# Patient Record
Sex: FEMALE | Race: BLACK OR AFRICAN AMERICAN | NOT HISPANIC OR LATINO
[De-identification: names, ages, dates, MRNs, and addresses within clinical notes are randomized per-mention and may not be internally consistent; named-entity substitution may affect disease eponyms.]

---

## 2018-05-09 PROBLEM — Z00.00 ENCOUNTER FOR PREVENTIVE HEALTH EXAMINATION: Status: ACTIVE | Noted: 2018-05-09

## 2021-05-31 ENCOUNTER — TRANSCRIPTION ENCOUNTER (OUTPATIENT)
Age: 26
End: 2021-05-31

## 2022-01-11 ENCOUNTER — TRANSCRIPTION ENCOUNTER (OUTPATIENT)
Age: 27
End: 2022-01-11

## 2022-10-28 ENCOUNTER — APPOINTMENT (OUTPATIENT)
Dept: ORTHOPEDIC SURGERY | Facility: CLINIC | Age: 27
End: 2022-10-28
Payer: COMMERCIAL

## 2022-10-28 VITALS — HEIGHT: 64 IN | BODY MASS INDEX: 46.1 KG/M2 | WEIGHT: 270 LBS

## 2022-10-28 DIAGNOSIS — I51.9 HEART DISEASE, UNSPECIFIED: ICD-10-CM

## 2022-10-28 PROCEDURE — 99203 OFFICE O/P NEW LOW 30 MIN: CPT

## 2022-10-28 NOTE — DISCUSSION/SUMMARY
[de-identified] : I reviewed patient's radiographs and discussed her condition and treatment options.  Given intermittent swelling, instability and pain, I advised obtaining further imaging to evaluate for possible osteochondral defects.  Patient voiced understanding and agreement with the plan.\par

## 2022-10-28 NOTE — HISTORY OF PRESENT ILLNESS
[de-identified] : Patient presents for evaluation on LT ankle pain. States she fractured her ankle about 10 years ago. Had proper treatment. States over the past year, she started noticing a lump on the medial side of her ankle and unable to WB on it some mornings. States she sees swelling and feels weakness in ankle. Patient had x-rays at  and saw PCP at [place] who referred to ortho for further management.

## 2022-10-28 NOTE — PHYSICAL EXAM
[Moderate] : moderate swelling of medial ankle [NL (40)] : plantar flexion 40 degrees [2+] : posterior tibialis pulse: 2+ [] : patient ambulates without assistive device [Left] : left ankle [Outside films reviewed] : Outside films reviewed [FreeTextEntry9] : ossicles along medial malleolus and lateral malleolus [TWNoteComboBox7] : dorsiflexion 5 degrees

## 2022-11-01 ENCOUNTER — FORM ENCOUNTER (OUTPATIENT)
Age: 27
End: 2022-11-01

## 2022-11-02 ENCOUNTER — APPOINTMENT (OUTPATIENT)
Dept: MRI IMAGING | Facility: CLINIC | Age: 27
End: 2022-11-02
Payer: COMMERCIAL

## 2022-11-02 PROCEDURE — 73721 MRI JNT OF LWR EXTRE W/O DYE: CPT | Mod: LT

## 2022-11-08 ENCOUNTER — APPOINTMENT (OUTPATIENT)
Dept: ORTHOPEDIC SURGERY | Facility: CLINIC | Age: 27
End: 2022-11-08
Payer: COMMERCIAL

## 2022-11-08 PROCEDURE — 99213 OFFICE O/P EST LOW 20 MIN: CPT

## 2022-11-08 NOTE — HISTORY OF PRESENT ILLNESS
[de-identified] : Since last visit, patient has obtained MRI and is here for results. States she received some blood work back from her PCP and may have thyroid condition.

## 2022-11-08 NOTE — DATA REVIEWED
[Ankle] : ankle [Report was reviewed and noted in the chart] : The report was reviewed and noted in the chart [I independently reviewed and interpreted images and report] : I independently reviewed and interpreted images and report [I reviewed the films/CD and agree] : I reviewed the films/CD and agree [Left] : left [FreeTextEntry1] : High grade tear of the anterior talofibular ligament and calcaneofibular ligament with low-grade tear of deep fibers of the deltoid. Tibiotalar joint effusion. Diffuse soft tissue edema. Minimal scarring of the tarsal sinus ligaments and fat, which can be seen with sinus tarsi syndrome.

## 2022-11-08 NOTE — DISCUSSION/SUMMARY
[de-identified] : I reviewed patient's MRI and discussed her condition and treatment options.  I advised starting PT and HEP.  Wear below knee compression socks.  Follow up with Dr. Rodriguez in 3 weeks.  Patient voiced understanding and agreement with the plan.\par

## 2022-12-01 ENCOUNTER — APPOINTMENT (OUTPATIENT)
Dept: ORTHOPEDIC SURGERY | Facility: CLINIC | Age: 27
End: 2022-12-01
Payer: COMMERCIAL

## 2022-12-01 VITALS — HEIGHT: 64 IN | BODY MASS INDEX: 46.1 KG/M2 | WEIGHT: 270 LBS

## 2022-12-01 DIAGNOSIS — Z78.9 OTHER SPECIFIED HEALTH STATUS: ICD-10-CM

## 2022-12-01 DIAGNOSIS — Z86.39 PERSONAL HISTORY OF OTHER ENDOCRINE, NUTRITIONAL AND METABOLIC DISEASE: ICD-10-CM

## 2022-12-01 PROCEDURE — 99213 OFFICE O/P EST LOW 20 MIN: CPT | Mod: 25

## 2022-12-01 PROCEDURE — 73610 X-RAY EXAM OF ANKLE: CPT | Mod: LT

## 2022-12-01 NOTE — ASSESSMENT
[FreeTextEntry1] : 26yo female with right ankle instability.  Will trial a course of nonsurgical management. \par 1.  cont PT\par 2.  rest/ice/compression/elevation\par 3.  activity modifications\par 4.  nsaids prn pain\par 5.  f/u 5 weeks

## 2022-12-01 NOTE — HISTORY OF PRESENT ILLNESS
[Gradual] : gradual [0] : 0 [Dull/Aching] : dull/aching [Sharp] : sharp [Throbbing] : throbbing [Intermittent] : intermittent [Household chores] : household chores [Leisure] : leisure [Work] : work [Social interactions] : social interactions [Rest] : rest [Full time] : Work status: full time [de-identified] : Patient is here for her left ankle. Patient states she fractured her ankle about 10 years ago without surgical intervention. Patient states over the past year, she started noticing a lump on the medial side of her ankle and unable to weight bear on her ankle occasionally. Patient states she gets swelling and has weakness in ankle. Patient states she gets sharp, aching and throbbing pain. Patient states she has been going to PT 2x a week for the past 3 weeks with minimal relief. Patient had MRI at [facility] on 11/2/2022. Patient was referred by Dr. Voss.\par \par Impression: \par 1. High-grade tear of the anterior talofibular ligament and calcaneofibular ligament with low-grade tear of deep \par fibers of the deltoid.\par 2. Tibiotalar joint effusion. Diffuse soft tissue edema.\par 3. Minimal scarring of the tarsal sinus ligaments and fat, which can be seen with sinus tarsi syndrome. [] : Post Surgical Visit: no [FreeTextEntry1] : Left ankle  [FreeTextEntry3] : 1 year ago  [FreeTextEntry5] : Patient states NKI [de-identified] : Movement  [de-identified] :

## 2022-12-01 NOTE — PHYSICAL EXAM
[NL (40)] : plantar flexion 40 degrees [NL 30)] : inversion 30 degrees [NL (20)] : eversion 20 degrees [5___] : [facility] 5[unfilled]/5 [2+] : posterior tibialis pulse: 2+ [] : patient ambulates without assistive device [Left] : left ankle [There are no fractures, subluxations or dislocations. No significant abnormalities are seen] : There are no fractures, subluxations or dislocations. No significant abnormalities are seen [de-identified] : mild + anterior drawer test of ankle compared to contralateral side [FreeTextEntry9] : old avulsion fractures of medial and lateral malleolus, no acute osseous abnormalities.

## 2023-01-05 ENCOUNTER — APPOINTMENT (OUTPATIENT)
Dept: ORTHOPEDIC SURGERY | Facility: CLINIC | Age: 28
End: 2023-01-05
Payer: COMMERCIAL

## 2023-01-05 PROCEDURE — 99213 OFFICE O/P EST LOW 20 MIN: CPT

## 2023-01-05 NOTE — ASSESSMENT
[FreeTextEntry1] : 28 yo female presenting for f/u of left ankle instability. Patient feels 30% improved compared to last visit. Reports that she has been going to PT\par -Rx PT renewed today\par -Discussed with patient that if her pain does not improve that she will be indicated for lateral ligament reconstruction surgery, patient understands\par -Activities as tolerated\par -Rest, ice, compression, elevation, NSAIDs PRN for pain. \par -All questions answered\par -F/u 6 weeks\par \par

## 2023-01-05 NOTE — HISTORY OF PRESENT ILLNESS
[Gradual] : gradual [3] : 3 [0] : 0 [Dull/Aching] : dull/aching [Sharp] : sharp [Throbbing] : throbbing [Intermittent] : intermittent [Household chores] : household chores [Leisure] : leisure [Work] : work [Social interactions] : social interactions [Rest] : rest [Full time] : Work status: full time [de-identified] : Patient is here for her left ankle. Patient is being treated for Sprain of anterior talofibular ligament. Patient states she has been going to PT 2x a week. Patient states she has medial ankle pain after being on her feet for long periods. \par \par Impression: \par 1. High-grade tear of the anterior talofibular ligament and calcaneofibular ligament with low-grade tear of deep \par fibers of the deltoid.\par 2. Tibiotalar joint effusion. Diffuse soft tissue edema.\par 3. Minimal scarring of the tarsal sinus ligaments and fat, which can be seen with sinus tarsi syndrome. [] : Post Surgical Visit: no [FreeTextEntry1] : Left ankle  [FreeTextEntry3] : 1 year ago  [FreeTextEntry5] : Patient states NKI [de-identified] : Movement  [de-identified] :

## 2023-01-05 NOTE — PHYSICAL EXAM
[NL (40)] : plantar flexion 40 degrees [NL 30)] : inversion 30 degrees [NL (20)] : eversion 20 degrees [5___] : [facility] 5[unfilled]/5 [2+] : posterior tibialis pulse: 2+ [Left] : left ankle [There are no fractures, subluxations or dislocations. No significant abnormalities are seen] : There are no fractures, subluxations or dislocations. No significant abnormalities are seen [] : non-antalgic [de-identified] : mild + anterior drawer test of ankle compared to contralateral side [FreeTextEntry9] : old avulsion fractures of medial and lateral malleolus, no acute osseous abnormalities.

## 2023-02-08 ENCOUNTER — APPOINTMENT (OUTPATIENT)
Dept: ORTHOPEDIC SURGERY | Facility: CLINIC | Age: 28
End: 2023-02-08
Payer: COMMERCIAL

## 2023-02-08 DIAGNOSIS — S93.492D SPRAIN OF OTHER LIGAMENT OF LEFT ANKLE, SUBSEQUENT ENCOUNTER: ICD-10-CM

## 2023-02-08 PROCEDURE — 99213 OFFICE O/P EST LOW 20 MIN: CPT

## 2023-02-08 NOTE — ASSESSMENT
[FreeTextEntry1] : 26 yo female presenting for f/u of left ankle instability. Patient reports that she feels 60% improvement. States that she has gone to PT and has been performing HEP with increased ROM and decreased instability symptoms.\par -Discussed with patient that if her instability symptoms recur to [facility] for surgical consultation. Discussed with patient that if symptoms worsen that she will be indicated for lateral ligament reconstruction\par -Continue HEP\par -Activities as tolerated, no restrictions\par -Rest, ice, compression, elevation, NSAIDs PRN for pain. \par -All questions answered\par -F/u PRN\par

## 2023-02-08 NOTE — PHYSICAL EXAM
[NL (40)] : plantar flexion 40 degrees [NL 30)] : inversion 30 degrees [NL (20)] : eversion 20 degrees [5___] : [facility] 5[unfilled]/5 [2+] : posterior tibialis pulse: 2+ [Left] : left ankle [There are no fractures, subluxations or dislocations. No significant abnormalities are seen] : There are no fractures, subluxations or dislocations. No significant abnormalities are seen [FreeTextEntry9] : old avulsion fractures of medial and lateral malleolus, no acute osseous abnormalities. [] : non-antalgic [de-identified] : mild + anterior drawer test of ankle compared to contralateral side

## 2023-02-08 NOTE — HISTORY OF PRESENT ILLNESS
[Gradual] : gradual [3] : 3 [0] : 0 [Dull/Aching] : dull/aching [Sharp] : sharp [Throbbing] : throbbing [Intermittent] : intermittent [Household chores] : household chores [Leisure] : leisure [Work] : work [Social interactions] : social interactions [Rest] : rest [Full time] : Work status: full time [de-identified] : Patient presents today for follow up Lt ankle pain. Patient reports attending PT 2x a week which she states continues to help. Patient states that there has been improvement in pain, ROM since last visit.  [] : Post Surgical Visit: no [FreeTextEntry1] : Left ankle  [FreeTextEntry3] : 1 year ago  [FreeTextEntry5] : Patient states NKI [de-identified] : Movement  [de-identified] :